# Patient Record
Sex: FEMALE | Race: WHITE | NOT HISPANIC OR LATINO | URBAN - METROPOLITAN AREA
[De-identification: names, ages, dates, MRNs, and addresses within clinical notes are randomized per-mention and may not be internally consistent; named-entity substitution may affect disease eponyms.]

---

## 2019-09-20 ENCOUNTER — INPATIENT (INPATIENT)
Facility: HOSPITAL | Age: 74
LOS: 3 days | Discharge: HOME | DRG: 482 | End: 2019-09-24
Attending: SPECIALIST | Admitting: SPECIALIST
Payer: MEDICARE

## 2019-09-20 VITALS
OXYGEN SATURATION: 100 % | WEIGHT: 160.06 LBS | RESPIRATION RATE: 14 BRPM | DIASTOLIC BLOOD PRESSURE: 86 MMHG | SYSTOLIC BLOOD PRESSURE: 140 MMHG | HEART RATE: 82 BPM | HEIGHT: 67 IN | TEMPERATURE: 98 F

## 2019-09-20 DIAGNOSIS — Z98.890 OTHER SPECIFIED POSTPROCEDURAL STATES: Chronic | ICD-10-CM

## 2019-09-20 DIAGNOSIS — Z90.49 ACQUIRED ABSENCE OF OTHER SPECIFIED PARTS OF DIGESTIVE TRACT: Chronic | ICD-10-CM

## 2019-09-20 DIAGNOSIS — Z90.5 ACQUIRED ABSENCE OF KIDNEY: Chronic | ICD-10-CM

## 2019-09-20 DIAGNOSIS — S42.001A FRACTURE OF UNSPECIFIED PART OF RIGHT CLAVICLE, INITIAL ENCOUNTER FOR CLOSED FRACTURE: ICD-10-CM

## 2019-09-20 DIAGNOSIS — S72.011A UNSPECIFIED INTRACAPSULAR FRACTURE OF RIGHT FEMUR, INITIAL ENCOUNTER FOR CLOSED FRACTURE: ICD-10-CM

## 2019-09-20 LAB
ALBUMIN SERPL ELPH-MCNC: 3.7 G/DL — SIGNIFICANT CHANGE UP (ref 3.3–5)
ALP SERPL-CCNC: 98 U/L — SIGNIFICANT CHANGE UP (ref 40–120)
ALT FLD-CCNC: 21 U/L — SIGNIFICANT CHANGE UP (ref 10–45)
ANION GAP SERPL CALC-SCNC: 12 MMOL/L — SIGNIFICANT CHANGE UP (ref 5–17)
APTT BLD: 27 SEC — LOW (ref 27.5–36.3)
AST SERPL-CCNC: 20 U/L — SIGNIFICANT CHANGE UP (ref 10–40)
BASOPHILS # BLD AUTO: 0 K/UL — SIGNIFICANT CHANGE UP (ref 0–0.2)
BASOPHILS NFR BLD AUTO: 0.2 % — SIGNIFICANT CHANGE UP (ref 0–2)
BILIRUB SERPL-MCNC: 0.6 MG/DL — SIGNIFICANT CHANGE UP (ref 0.2–1.2)
BUN SERPL-MCNC: 19 MG/DL — SIGNIFICANT CHANGE UP (ref 7–23)
CALCIUM SERPL-MCNC: 9 MG/DL — SIGNIFICANT CHANGE UP (ref 8.4–10.5)
CHLORIDE SERPL-SCNC: 103 MMOL/L — SIGNIFICANT CHANGE UP (ref 96–108)
CO2 SERPL-SCNC: 23 MMOL/L — SIGNIFICANT CHANGE UP (ref 22–31)
CREAT SERPL-MCNC: 0.92 MG/DL — SIGNIFICANT CHANGE UP (ref 0.5–1.3)
EOSINOPHIL # BLD AUTO: 0.1 K/UL — SIGNIFICANT CHANGE UP (ref 0–0.5)
EOSINOPHIL NFR BLD AUTO: 0.8 % — SIGNIFICANT CHANGE UP (ref 0–6)
GLUCOSE SERPL-MCNC: 109 MG/DL — HIGH (ref 70–99)
HCT VFR BLD CALC: 36.3 % — SIGNIFICANT CHANGE UP (ref 34.5–45)
HGB BLD-MCNC: 12.1 G/DL — SIGNIFICANT CHANGE UP (ref 11.5–15.5)
INR BLD: 1.01 RATIO — SIGNIFICANT CHANGE UP (ref 0.88–1.16)
LYMPHOCYTES # BLD AUTO: 1 K/UL — SIGNIFICANT CHANGE UP (ref 1–3.3)
LYMPHOCYTES # BLD AUTO: 9.9 % — LOW (ref 13–44)
MCHC RBC-ENTMCNC: 30.7 PG — SIGNIFICANT CHANGE UP (ref 27–34)
MCHC RBC-ENTMCNC: 33.2 GM/DL — SIGNIFICANT CHANGE UP (ref 32–36)
MCV RBC AUTO: 92.5 FL — SIGNIFICANT CHANGE UP (ref 80–100)
MONOCYTES # BLD AUTO: 0.6 K/UL — SIGNIFICANT CHANGE UP (ref 0–0.9)
MONOCYTES NFR BLD AUTO: 5.8 % — SIGNIFICANT CHANGE UP (ref 2–14)
NEUTROPHILS # BLD AUTO: 8.2 K/UL — HIGH (ref 1.8–7.4)
NEUTROPHILS NFR BLD AUTO: 83.2 % — HIGH (ref 43–77)
PLATELET # BLD AUTO: 216 K/UL — SIGNIFICANT CHANGE UP (ref 150–400)
POTASSIUM SERPL-MCNC: 4.1 MMOL/L — SIGNIFICANT CHANGE UP (ref 3.5–5.3)
POTASSIUM SERPL-SCNC: 4.1 MMOL/L — SIGNIFICANT CHANGE UP (ref 3.5–5.3)
PROT SERPL-MCNC: 6.1 G/DL — SIGNIFICANT CHANGE UP (ref 6–8.3)
PROTHROM AB SERPL-ACNC: 11.6 SEC — SIGNIFICANT CHANGE UP (ref 10–12.9)
RBC # BLD: 3.92 M/UL — SIGNIFICANT CHANGE UP (ref 3.8–5.2)
RBC # FLD: 11.3 % — SIGNIFICANT CHANGE UP (ref 10.3–14.5)
SODIUM SERPL-SCNC: 138 MMOL/L — SIGNIFICANT CHANGE UP (ref 135–145)
WBC # BLD: 9.9 K/UL — SIGNIFICANT CHANGE UP (ref 3.8–10.5)
WBC # FLD AUTO: 9.9 K/UL — SIGNIFICANT CHANGE UP (ref 3.8–10.5)

## 2019-09-20 PROCEDURE — 99222 1ST HOSP IP/OBS MODERATE 55: CPT | Mod: 57

## 2019-09-20 PROCEDURE — 73000 X-RAY EXAM OF COLLAR BONE: CPT | Mod: 26,RT

## 2019-09-20 PROCEDURE — 73502 X-RAY EXAM HIP UNI 2-3 VIEWS: CPT | Mod: 26,RT

## 2019-09-20 PROCEDURE — 99285 EMERGENCY DEPT VISIT HI MDM: CPT

## 2019-09-20 PROCEDURE — 70450 CT HEAD/BRAIN W/O DYE: CPT | Mod: 26

## 2019-09-20 PROCEDURE — 73030 X-RAY EXAM OF SHOULDER: CPT | Mod: 26,RT

## 2019-09-20 PROCEDURE — 72192 CT PELVIS W/O DYE: CPT | Mod: 26

## 2019-09-20 PROCEDURE — 73060 X-RAY EXAM OF HUMERUS: CPT | Mod: 26,RT

## 2019-09-20 PROCEDURE — 72125 CT NECK SPINE W/O DYE: CPT | Mod: 26

## 2019-09-20 PROCEDURE — 76377 3D RENDER W/INTRP POSTPROCES: CPT | Mod: 26

## 2019-09-20 PROCEDURE — 93010 ELECTROCARDIOGRAM REPORT: CPT

## 2019-09-20 PROCEDURE — 71045 X-RAY EXAM CHEST 1 VIEW: CPT | Mod: 26

## 2019-09-20 PROCEDURE — 73552 X-RAY EXAM OF FEMUR 2/>: CPT | Mod: 26,RT

## 2019-09-20 RX ORDER — DOCUSATE SODIUM 100 MG
100 CAPSULE ORAL THREE TIMES A DAY
Refills: 0 | Status: DISCONTINUED | OUTPATIENT
Start: 2019-09-20 | End: 2019-09-24

## 2019-09-20 RX ORDER — DIPHENHYDRAMINE HCL 50 MG
25 CAPSULE ORAL AT BEDTIME
Refills: 0 | Status: DISCONTINUED | OUTPATIENT
Start: 2019-09-20 | End: 2019-09-24

## 2019-09-20 RX ORDER — INFLUENZA VIRUS VACCINE 15; 15; 15; 15 UG/.5ML; UG/.5ML; UG/.5ML; UG/.5ML
0.5 SUSPENSION INTRAMUSCULAR ONCE
Refills: 0 | Status: COMPLETED | OUTPATIENT
Start: 2019-09-20 | End: 2019-09-24

## 2019-09-20 RX ORDER — OXYCODONE HYDROCHLORIDE 5 MG/1
5 TABLET ORAL EVERY 4 HOURS
Refills: 0 | Status: DISCONTINUED | OUTPATIENT
Start: 2019-09-20 | End: 2019-09-24

## 2019-09-20 RX ORDER — ACETAMINOPHEN 500 MG
975 TABLET ORAL ONCE
Refills: 0 | Status: COMPLETED | OUTPATIENT
Start: 2019-09-20 | End: 2019-09-20

## 2019-09-20 RX ORDER — OXYCODONE HYDROCHLORIDE 5 MG/1
10 TABLET ORAL EVERY 4 HOURS
Refills: 0 | Status: DISCONTINUED | OUTPATIENT
Start: 2019-09-20 | End: 2019-09-24

## 2019-09-20 RX ORDER — FAMOTIDINE 10 MG/ML
20 INJECTION INTRAVENOUS EVERY 12 HOURS
Refills: 0 | Status: DISCONTINUED | OUTPATIENT
Start: 2019-09-20 | End: 2019-09-20

## 2019-09-20 RX ORDER — SODIUM CHLORIDE 9 MG/ML
1000 INJECTION INTRAMUSCULAR; INTRAVENOUS; SUBCUTANEOUS
Refills: 0 | Status: DISCONTINUED | OUTPATIENT
Start: 2019-09-20 | End: 2019-09-24

## 2019-09-20 RX ORDER — OXYCODONE HYDROCHLORIDE 5 MG/1
5 TABLET ORAL ONCE
Refills: 0 | Status: DISCONTINUED | OUTPATIENT
Start: 2019-09-20 | End: 2019-09-20

## 2019-09-20 RX ORDER — HEPARIN SODIUM 5000 [USP'U]/ML
5000 INJECTION INTRAVENOUS; SUBCUTANEOUS EVERY 8 HOURS
Refills: 0 | Status: COMPLETED | OUTPATIENT
Start: 2019-09-20 | End: 2019-09-20

## 2019-09-20 RX ADMIN — OXYCODONE HYDROCHLORIDE 5 MILLIGRAM(S): 5 TABLET ORAL at 18:08

## 2019-09-20 RX ADMIN — Medication 975 MILLIGRAM(S): at 13:20

## 2019-09-20 NOTE — H&P ADULT - NSHPLABSRESULTS_GEN_ALL_CORE
Imaging:  XR demonstrating R femoral neck fracture and R distal clavicle fracture                            12.1   9.9   )-----------( 216      ( 20 Sep 2019 17:55 )             36.3       09-20    138  |  103  |  19  ----------------------------<  109<H>  4.1   |  23  |  0.92    Ca    9.0      20 Sep 2019 17:55    TPro  6.1  /  Alb  3.7  /  TBili  0.6  /  DBili  x   /  AST  20  /  ALT  21  /  AlkPhos  98  09-20                  PT/INR - ( 20 Sep 2019 17:55 )   PT: 11.6 sec;   INR: 1.01 ratio         PTT - ( 20 Sep 2019 17:55 )  PTT:27.0 sec    Lactate Trend            CAPILLARY BLOOD GLUCOSE

## 2019-09-20 NOTE — ED PROVIDER NOTE - OBJECTIVE STATEMENT
74 yo female with unremarkable pmhx presenting with shoulder and right hip pain s/p fall. Patient drove in from [place], walked up stairs and fell upstairs today, fell on her right shoulder and hip, and since then has had constant moderate pain 72 yo female with unremarkable pmhx presenting with shoulder and right hip pain s/p fall. Patient drove in from [place], walked up stairs and fell upstairs today, fell on her right shoulder and hip, and since then has had constant moderate pain since then. Has been unable to ambulate and move arm since then.    Denies LOC CP SOB rash, bleeding, head trauma, denies being on AC  PMHx- negative  PSHx- ccx, kidney donation, SBO, ventral hernia repair

## 2019-09-20 NOTE — ED PROVIDER NOTE - CLINICAL SUMMARY MEDICAL DECISION MAKING FREE TEXT BOX
74 yo female it unremarkable pmhx presenting with right hip/shoulder pain s/p fall. will evaluate for fx with xray hip and shoulder, will give pain control, and will reassess 72 yo female it unremarkable pmhx presenting with right hip/shoulder pain s/p fall. will evaluate for fx with xray hip and shoulder, will give pain control, and will reassess    TJewell Quiroz MD: Pt is a 72 y/o female with unremarkable pmhx, not on medications, presenting with shoulder and right hip pain s/p fall. Patient drove in from [place], walked up stairs and fell upstairs today, fell on her right shoulder and hip, and since then has had constant moderate pain since then. Has been unable to ambulate and move arm since then. Ddx includes, however, is not limited to: R hip fx, pelvic fx, shoulder fx, humeral fx/dislocation, other traumatic injury. Plan: XR R shoulder, R hip, pelvis, pain control, CTH, reassess

## 2019-09-20 NOTE — H&P ADULT - NSHPPHYSICALEXAM_GEN_ALL_CORE
T(C): 36.5 (09-20-19 @ 12:23)  HR: 82 (09-20-19 @ 12:23)  BP: 140/86 (09-20-19 @ 12:23)  RR: 14 (09-20-19 @ 12:23)  SpO2: 100% (09-20-19 @ 12:23)  Wt(kg): --    PE   RLE: Skin intact; Compartments soft. No ecchymosis/soft tissue swelling.  + TTP around R Hip. No TTP to knee/leg/ankle/foot. ROM limited 2/2 pain. Unable to SLR. + Log Roll/Heel Strike. + DP/PT pulses 2+/4. SILT L2-S1. + TA/EHL/FHL/GS.    R UE: skin intact, no skin tenting, +ttp clavicle, no bony ttp elsewhere, +ax/msc/r/u/m/ain/pin function, C5-T1 SILT, radial pulse intact, compartments soft, warm/well perfused    Secondary Survey: No TTP over bony prominences, SILT, palpable pulses, full/painless range of motion, compartments soft

## 2019-09-20 NOTE — ED ADULT NURSE NOTE - NSIMPLEMENTINTERV_GEN_ALL_ED
Implemented All Universal Safety Interventions:  [place] to call system. Call bell, personal items and telephone within reach. Instruct patient to call for assistance. Room bathroom lighting operational. Non-slip footwear when patient is off stretcher. Physically safe environment: no spills, clutter or unnecessary equipment. Stretcher in lowest position, wheels locked, appropriate side rails in place.

## 2019-09-20 NOTE — H&P ADULT - ASSESSMENT
A/P: 73F with R clavicle fracture and R femoral neck fracture  Plan for OR ...  Pain control  NWB R/L UE in sling  Active movement of fingers/wrist/elbow encouraged  Ice  Possible need for surgical intervention discussed with patient   Follow up with  --- as outpatient in 5-7 days, call office for appointment  No acute intervention, ortho stable for DC A/P: 73F with R clavicle fracture and R femoral neck fracture  Plan for OR tomorrow with Dr. Brown for right hip CRPP  NPO after midnight except meds  IVFs while NPO  Hold chem dvt ppx after midnight  SCDs  Medical optimization for OR  Pain control  Bedrest, NWB RLE  NWB RUE In sling  Likely conservative management of right clavicle fracture  Active movement of fingers/wrist/elbow encouraged  Ice  Will discuss with Dr. Brown and advise if plan changes

## 2019-09-20 NOTE — ED PROVIDER NOTE - MUSCULOSKELETAL, MLM
pain with ROM of right shoulder, TTP of anterior right shoulder. slight skin tenting in right clavicle with TTP. TTP of right hip, unable to range due to pain.

## 2019-09-20 NOTE — H&P ADULT - HISTORY OF PRESENT ILLNESS
73y RHD Female presents to ED s/p [facility] fall c/o severe R Hip pain and R clavicle/shoulder pain and inability to ambulate after driving from [place] to help her friend during move.  Patient denies head hit or LOC. Localizes pain to R hip/femur. Patient denies radiation of pain. Patient denies numbness/tingling/burning in the RLE. Patient denies any other injuries. Patient is a community ambulator without assistive devices. Patient has no issues w/ ADLs/IADLs.  Patient is s/p nephrectomy for kidney donation.

## 2019-09-20 NOTE — CONSULT NOTE ADULT - SUBJECTIVE AND OBJECTIVE BOX
Patient is a 73y old  Female who presents with a chief complaint of right femoral neck fracture (20 Sep 2019 21:31)      HPI:  73y RHD Female presents to ED s/p mech fall c/o severe R Hip pain and R clavicle/shoulder pain and inability to ambulate after driving from [place] to help her friend during move.  Patient denies head hit or LOC. Localizes pain to R hip/femur. Patient denies radiation of pain. Patient denies numbness/tingling/burning in the RLE. Patient denies any other injuries. Patient is a community ambulator without assistive devices. Patient has no issues w/ ADLs/IADLs.  Patient is s/p nephrectomy for kidney donation. (20 Sep 2019 21:31)      MEDICATIONS  (STANDING):  docusate sodium 100 milliGRAM(s) Oral three times a day  influenza   Vaccine 0.5 milliLiter(s) IntraMuscular once  multivitamin 1 Tablet(s) Oral daily  sodium chloride 0.9%. 1000 milliLiter(s) (75 mL/Hr) IV Continuous <Continuous>    MEDICATIONS  (PRN):  diphenhydrAMINE 25 milliGRAM(s) Oral at bedtime PRN Insomnia  oxyCODONE    IR 10 milliGRAM(s) Oral every 4 hours PRN Severe Pain (7 - 10)  oxyCODONE    IR 5 milliGRAM(s) Oral every 4 hours PRN Moderate Pain (4 - 6)      Allergies    Cipro (Hives)  proton pump inhibitors (Other (Moderate))    Intolerances      PAST MEDICAL HISTORY:  No pertinent past medical history    PAST SURGICAL HISTORY:  S/p nephrectomy  H/O ventral hernia repair  S/P cholecystectomy      Diet:  ( x  ) Regular   (   ) Low Sodium   (   ) Low Cholesterol   (   ) Diabetic   (   ) Other    FAMILY HISTORY:      SOCIAL HISTORY:  Marital Status:  (   )    (   ) Single    (   )    (   )   Occupation:   Lives with: (   ) alone  (   ) children   (   ) spouse   (   ) parents  (   ) other  Substance Use: (  x) never used  (  ) other:  Tobacco Usage:  ( x  ) never smoked   (   ) former smoker   (   ) current smoker  (   ) pack years  (   ) last cigarette date  Alcohol Usage:  rarely  Advanced Directives: (   ) None    (   ) DNR    (   ) DNI    (   ) Health Care Proxy:     REVIEW OF SYSTEM:  CONSTITUTIONAL: No fever, No change in weight, No fatigue  HEAD: No headache, No dizziness, No recent trauma  EYES: No eye pain, No visual disturbances, No discharge  ENT:  No difficulty hearing, No tinnitus, No vertigo, No sinus pain, No throat pain  NECK: No pain, No stiffness  BREASTS: No pain, No masses, No nipple discharge  RESPIRATORY: No cough, No wheezing, No chills, No hemoptysis, No shortness of breath at rest or exertional shortness of breath  CARDIOVASCULAR: No chest pain, No palpitations, No dizziness, No CHF, No arrhythmia, No cardiomegaly, No leg swelling  GASTROINTESTINAL: No abdominal, No epigastric pain. No nausea, No vomiting, No hematemesis, No diarrhea, No constipation. No melena, No hematochezia. No GERD  GENITOURINARY: No dysuria, No frequency, No hematuria, No incontinence, No nocturia, No hesitancy,  SKIN: No itching, No burning, No rashes, No lesions   LYMPH NODES: No history of enlarged glands  ENDOCRINE: No heat or cold intolerance, No hair loss. No osteoporosis, No thyroid disease  MUSCULOSKELETAL: No joint pain or swelling,   (+) right hip pain and right clavicle pain   PSYCHIATRIC: No depression, No anxiety, No mood swings, No difficulty sleeping  HEME/LYMPH: No easy bruising, No anticoagulants, No bleeding disorder, No bleeding gums  ALLERGY AND IMMUNOLOGIC: No hives, No eczema  NEUROLOGICAL: No memory loss, No loss of strength, No numbness, No tremors    VITALS:  Vital Signs Last 24 Hrs  T(C): 36.7 (20 Sep 2019 23:17), Max: 36.8 (20 Sep 2019 21:40)  T(F): 98.1 (20 Sep 2019 23:17), Max: 98.3 (20 Sep 2019 21:40)  HR: 70 (20 Sep 2019 23:17) (68 - 82)  BP: 125/58 (20 Sep 2019 23:17) (125/58 - 141/85)  BP(mean): --  RR: 18 (20 Sep 2019 23:17) (14 - 18)  SpO2: 98% (20 Sep 2019 23:17) (97% - 100%)  I&O's Summary      PHYSICAL EXAM:  GENERAL: NAD, well nourished and conversant  HEAD:  Atraumatic  EYES: EOM, PERRLA, conjunctiva pink and sclera white  ENT: No tonsillar erythema, exudates, or enlargement, moist mucous membranes, good dentition, no lesions  NECK: Supple, No JVD, normal thyroid, carotids with normal upstrokes and no bruits  CHEST/LUNG: Clear to auscultation bilaterally, No rales, rhonchi, wheezing, or rubs  HEART: Regular rate and rhythm, No murmurs, rubs, or gallops  ABDOMEN: Soft, nondistended, no masses, guarding, tenderness or rebound, bowel sounds present  EXTREMITIES:  2+ Peripheral Pulses, No clubbing, cyanosis, or edema. (+) Fracture right clavicle  (+) right femoral neck fracture  SKIN: No rashes or lesions  NERVOUS SYSTEM:  Alert & Oriented X3, normal cognitive function. Motor Strength 5/5 right upper and right lower.  5/5 left upper and left lower extremities, DTRs 2+ intact and symmetric    LABS:    EKG NSR no acute changes      CBC Full  -  ( 20 Sep 2019 17:55 )  WBC Count : 9.9 K/uL  RBC Count : 3.92 M/uL  Hemoglobin : 12.1 g/dL  Hematocrit : 36.3 %  Platelet Count - Automated : 216 K/uL  Mean Cell Volume : 92.5 fl  Mean Cell Hemoglobin : 30.7 pg  Mean Cell Hemoglobin Concentration : 33.2 gm/dL  Auto Neutrophil # : 8.2 K/uL  Auto Lymphocyte # : 1.0 K/uL  Auto Monocyte # : 0.6 K/uL  Auto Eosinophil # : 0.1 K/uL  Auto Basophil # : 0.0 K/uL  Auto Neutrophil % : 83.2 %  Auto Lymphocyte % : 9.9 %  Auto Monocyte % : 5.8 %  Auto Eosinophil % : 0.8 %  Auto Basophil % : 0.2 %    09-20    138  |  103  |  19  ----------------------------<  109<H>  4.1   |  23  |  0.92    Ca    9.0      20 Sep 2019 17:55    TPro  6.1  /  Alb  3.7  /  TBili  0.6  /  DBili  x   /  AST  20  /  ALT  21  /  AlkPhos  98  09-20    LIVER FUNCTIONS - ( 20 Sep 2019 17:55 )  Alb: 3.7 g/dL / Pro: 6.1 g/dL / ALK PHOS: 98 U/L / ALT: 21 U/L / AST: 20 U/L / GGT: x           PT/INR - ( 20 Sep 2019 17:55 )   PT: 11.6 sec;   INR: 1.01 ratio         PTT - ( 20 Sep 2019 17:55 )  PTT:27.0 sec    CAPILLARY BLOOD GLUCOSE          RADIOLOGY & ADDITIONAL TESTS:    Consultant(s):    Care Discussed with Consultants/Other Providers [ ] YES  [ ] NO

## 2019-09-20 NOTE — ED ADULT NURSE NOTE - OBJECTIVE STATEMENT
4 yo female A&OX3 presents to the ED s/p trip and fall. Pt states that she tripped over a uneven surface, pt c/o r shoulder and hip pain unable to ambulate due to worsening pain. Pt r shoulder appears deformed, + pulses in the r arm, + sensation to touch, no numbness or tingling. Pt able to  r fingers and toes. Lungs clear, equal b/l no sob or cough. Pt denies any AC use. MAEX4

## 2019-09-21 LAB
ANION GAP SERPL CALC-SCNC: 10 MMOL/L — SIGNIFICANT CHANGE UP (ref 5–17)
APPEARANCE UR: CLEAR — SIGNIFICANT CHANGE UP
APTT BLD: 27.8 SEC — SIGNIFICANT CHANGE UP (ref 27.5–36.3)
BACTERIA # UR AUTO: NEGATIVE — SIGNIFICANT CHANGE UP
BILIRUB UR-MCNC: NEGATIVE — SIGNIFICANT CHANGE UP
BLD GP AB SCN SERPL QL: NEGATIVE — SIGNIFICANT CHANGE UP
BLD GP AB SCN SERPL QL: NEGATIVE — SIGNIFICANT CHANGE UP
BUN SERPL-MCNC: 16 MG/DL — SIGNIFICANT CHANGE UP (ref 7–23)
CALCIUM SERPL-MCNC: 8.3 MG/DL — LOW (ref 8.4–10.5)
CHLORIDE SERPL-SCNC: 103 MMOL/L — SIGNIFICANT CHANGE UP (ref 96–108)
CO2 SERPL-SCNC: 23 MMOL/L — SIGNIFICANT CHANGE UP (ref 22–31)
COLOR SPEC: YELLOW — SIGNIFICANT CHANGE UP
CREAT SERPL-MCNC: 0.9 MG/DL — SIGNIFICANT CHANGE UP (ref 0.5–1.3)
DIFF PNL FLD: NEGATIVE — SIGNIFICANT CHANGE UP
EPI CELLS # UR: 4 /HPF — SIGNIFICANT CHANGE UP
GLUCOSE SERPL-MCNC: 121 MG/DL — HIGH (ref 70–99)
GLUCOSE UR QL: NEGATIVE — SIGNIFICANT CHANGE UP
HCT VFR BLD CALC: 32.7 % — LOW (ref 34.5–45)
HGB BLD-MCNC: 11.2 G/DL — LOW (ref 11.5–15.5)
HYALINE CASTS # UR AUTO: 0 /LPF — SIGNIFICANT CHANGE UP (ref 0–2)
INR BLD: 1.08 RATIO — SIGNIFICANT CHANGE UP (ref 0.88–1.16)
KETONES UR-MCNC: NEGATIVE — SIGNIFICANT CHANGE UP
LEUKOCYTE ESTERASE UR-ACNC: ABNORMAL
MCHC RBC-ENTMCNC: 31.5 PG — SIGNIFICANT CHANGE UP (ref 27–34)
MCHC RBC-ENTMCNC: 34.2 GM/DL — SIGNIFICANT CHANGE UP (ref 32–36)
MCV RBC AUTO: 92.2 FL — SIGNIFICANT CHANGE UP (ref 80–100)
NITRITE UR-MCNC: NEGATIVE — SIGNIFICANT CHANGE UP
PH UR: 6.5 — SIGNIFICANT CHANGE UP (ref 5–8)
PLATELET # BLD AUTO: 190 K/UL — SIGNIFICANT CHANGE UP (ref 150–400)
POTASSIUM SERPL-MCNC: 3.9 MMOL/L — SIGNIFICANT CHANGE UP (ref 3.5–5.3)
POTASSIUM SERPL-SCNC: 3.9 MMOL/L — SIGNIFICANT CHANGE UP (ref 3.5–5.3)
PROT UR-MCNC: SIGNIFICANT CHANGE UP
PROTHROM AB SERPL-ACNC: 12.4 SEC — SIGNIFICANT CHANGE UP (ref 10–12.9)
RBC # BLD: 3.54 M/UL — LOW (ref 3.8–5.2)
RBC # FLD: 11.2 % — SIGNIFICANT CHANGE UP (ref 10.3–14.5)
RBC CASTS # UR COMP ASSIST: 9 /HPF — HIGH (ref 0–4)
RH IG SCN BLD-IMP: POSITIVE — SIGNIFICANT CHANGE UP
RH IG SCN BLD-IMP: POSITIVE — SIGNIFICANT CHANGE UP
SODIUM SERPL-SCNC: 136 MMOL/L — SIGNIFICANT CHANGE UP (ref 135–145)
SP GR SPEC: 1.02 — SIGNIFICANT CHANGE UP (ref 1.01–1.02)
UROBILINOGEN FLD QL: ABNORMAL
WBC # BLD: 5.3 K/UL — SIGNIFICANT CHANGE UP (ref 3.8–10.5)
WBC # FLD AUTO: 5.3 K/UL — SIGNIFICANT CHANGE UP (ref 3.8–10.5)
WBC UR QL: 9 /HPF — HIGH (ref 0–5)

## 2019-09-21 PROCEDURE — 23500 CLTX CLAVICULAR FX W/O MNPJ: CPT | Mod: 59,RT

## 2019-09-21 PROCEDURE — 27236 TREAT THIGH FRACTURE: CPT | Mod: RT

## 2019-09-21 RX ORDER — ENOXAPARIN SODIUM 100 MG/ML
40 INJECTION SUBCUTANEOUS DAILY
Refills: 0 | Status: DISCONTINUED | OUTPATIENT
Start: 2019-09-21 | End: 2019-09-24

## 2019-09-21 RX ORDER — HYDROMORPHONE HYDROCHLORIDE 2 MG/ML
0.5 INJECTION INTRAMUSCULAR; INTRAVENOUS; SUBCUTANEOUS
Refills: 0 | Status: DISCONTINUED | OUTPATIENT
Start: 2019-09-21 | End: 2019-09-21

## 2019-09-21 RX ORDER — ONDANSETRON 8 MG/1
4 TABLET, FILM COATED ORAL ONCE
Refills: 0 | Status: DISCONTINUED | OUTPATIENT
Start: 2019-09-21 | End: 2019-09-21

## 2019-09-21 RX ADMIN — OXYCODONE HYDROCHLORIDE 5 MILLIGRAM(S): 5 TABLET ORAL at 01:00

## 2019-09-21 RX ADMIN — OXYCODONE HYDROCHLORIDE 5 MILLIGRAM(S): 5 TABLET ORAL at 00:33

## 2019-09-21 RX ADMIN — SODIUM CHLORIDE 75 MILLILITER(S): 9 INJECTION INTRAMUSCULAR; INTRAVENOUS; SUBCUTANEOUS at 00:34

## 2019-09-21 RX ADMIN — Medication 100 MILLIGRAM(S): at 05:41

## 2019-09-21 NOTE — PROGRESS NOTE ADULT - ASSESSMENT
73y RHD Female presents to ED s/p [facility] fall c/o severe R Hip pain and R clavicle/shoulder pain and inability to ambulate after driving from [place] to help her friend during move.  Patient denies head hit or LOC. Localizes pain to R hip/femur. Patient denies radiation of pain. Patient denies numbness/tingling/burning in the RLE. Patient denies any other injuries. Patient is a community ambulator without assistive devices. Patient has no issues w/ ADLs/IADLs.  Patient is s/p nephrectomy for kidney donation. (20 Sep 2019 21:31)  Patient seen postop  tolerated surgery well.  ·  POST-OP DIAGNOSIS:  Fracture of femoral neck, right 21-Sep-2019 13:49:34   ·  PROCEDURES:  Closed reduction and percutaneous pinning of right hip 21-Sep-2019 13:49:14    Tolerated surgery well

## 2019-09-21 NOTE — BRIEF OPERATIVE NOTE - NSICDXBRIEFPROCEDURE_GEN_ALL_CORE_FT
PROCEDURES:  Closed reduction and percutaneous pinning of right hip 21-Sep-2019 13:49:14  Gama Edmondson

## 2019-09-21 NOTE — PROGRESS NOTE ADULT - SUBJECTIVE AND OBJECTIVE BOX
Patient is a 73y old  Female who presents with a chief complaint of right femoral neck fracture (20 Sep 2019 21:31)      HPI:  73y RHD Female presents to ED s/p mech fall c/o severe R Hip pain and R clavicle/shoulder pain and inability to ambulate after driving from [place] to help her friend during move.  Patient denies head hit or LOC. Localizes pain to R hip/femur. Patient denies radiation of pain. Patient denies numbness/tingling/burning in the RLE. Patient denies any other injuries. Patient is a community ambulator without assistive devices. Patient has no issues w/ ADLs/IADLs.  Patient is s/p nephrectomy for kidney donation. (20 Sep 2019 21:31)  Patient seen postop  tolerated surgery well.  ·  POST-OP DIAGNOSIS:  Fracture of femoral neck, right 21-Sep-2019 13:49:34   ·  PROCEDURES:  Closed reduction and percutaneous pinning of right hip 21-Sep-2019 13:49:14    MEDICATIONS  (STANDING):  docusate sodium 100 milliGRAM(s) Oral three times a day  influenza   Vaccine 0.5 milliLiter(s) IntraMuscular once  multivitamin 1 Tablet(s) Oral daily  sodium chloride 0.9%. 1000 milliLiter(s) (75 mL/Hr) IV Continuous <Continuous>    MEDICATIONS  (PRN):  diphenhydrAMINE 25 milliGRAM(s) Oral at bedtime PRN Insomnia  oxyCODONE    IR 10 milliGRAM(s) Oral every 4 hours PRN Severe Pain (7 - 10)  oxyCODONE    IR 5 milliGRAM(s) Oral every 4 hours PRN Moderate Pain (4 - 6)      Allergies    Cipro (Hives)  proton pump inhibitors (Other (Moderate))    Intolerances          Diet:  ( x  ) Regular   (   ) Low Sodium   (   ) Low Cholesterol   (   ) Diabetic   (   ) Other    FAMILY HISTORY:  T(C): 36.9 (09-21-19 @ 05:33), Max: 36.9 (09-21-19 @ 05:33)  HR: 75 (09-21-19 @ 05:33) (68 - 82)  BP: 111/68 (09-21-19 @ 05:33) (111/68 - 141/85)  RR: 18 (09-21-19 @ 05:33) (14 - 18)  SpO2: 94% (09-21-19 @ 05:33) (94% - 100%)  Wt(kg): --            PHYSICAL EXAM:  GENERAL: NAD, well nourished and conversant  HEAD:  Atraumatic  EYES: EOM, PERRLA, conjunctiva pink and sclera white  ENT: No tonsillar erythema, exudates, or enlargement, moist mucous membranes, good dentition, no lesions  NECK: Supple, No JVD, normal thyroid, carotids with normal upstrokes and no bruits  CHEST/LUNG: Clear to auscultation bilaterally, No rales, rhonchi, wheezing, or rubs  HEART: Regular rate and rhythm, No murmurs, rubs, or gallops  ABDOMEN: Soft, nondistended, no masses, guarding, tenderness or rebound, bowel sounds present  EXTREMITIES:  2+ Peripheral Pulses, No clubbing, cyanosis, or edema. (+) Fracture right clavicle  (+) right femoral neck fracture Percutaneous pinning of right hip  SKIN: No rashes or lesions  NERVOUS SYSTEM:  Alert & Oriented X3, normal cognitive function. Motor Strength 5/5 right upper and right lower.  5/5 left upper and left lower extremities, DTRs 2+ intact and symmetric    LABS:                   11.2   5.3   )-----------( 190      ( 21 Sep 2019 06:39 )             32.7    09-21    136  |  103  |  16  ----------------------------<  121<H>  3.9   |  23  |  0.90    Ca    8.3<L>      21 Sep 2019 06:39    TPro  6.1  /  Alb  3.7  /  TBili  0.6  /  DBili  x   /  AST  20  /  ALT  21  /  AlkPhos  98  09-20    PT/INR - ( 21 Sep 2019 06:39 )   PT: 12.4 sec;   INR: 1.08 ratio         PTT - ( 21 Sep 2019 06:39 )  PTT:27.8 sec

## 2019-09-21 NOTE — CHART NOTE - NSCHARTNOTEFT_GEN_A_CORE
Resting without complaints.  No Chest Pain, SOB, N/V.    T(C): 36.5 (09-21-19 @ 14:45), Max: 37 (09-21-19 @ 08:50)  HR: 73 (09-21-19 @ 14:45) (68 - 81)  BP: 124/60 (09-21-19 @ 14:45) (103/65 - 141/85)  RR: 13 (09-21-19 @ 14:45) (13 - 18)  SpO2: 97% (09-21-19 @ 14:45) (94% - 100%)      Exam:  Alert and [place], No Acute Distress  Card: +S1/S2, RRR  Pulm: CTAB    Laterality: R Clavicle (Non-Op)  Dressing: In Sling  SILT  Radial Pulse appreciate 2+  Normal Capillary Refill Digits 1-5 < 2 seconds    Laterality: R Hip  Dressing: Aquacel C/D/I  Calves soft, non-tender bilaterally  +PF/DF/EHL/FHL  SILT  + DP pulse    Xray: Intraop Fluroscopy: S/P R Hip CRPP ORIF, Hardware intact and in place with no signs of sara hardware Fx.                          11.2   5.3   )-----------( 190      ( 21 Sep 2019 06:39 )             32.7    09-21    136  |  103  |  16  ----------------------------<  121<H>  3.9   |  23  |  0.90    Ca    8.3<L>      21 Sep 2019 06:39    TPro  6.1  /  Alb  3.7  /  TBili  0.6  /  DBili  x   /  AST  20  /  ALT  21  /  AlkPhos  98  09-20      A/P: 73y Female S/p R Hip CRPP ORIF. R Clavicular Fx Non-Op, VSS. NAD  -PT/OT-WBAT RLE, NWB RUE in Sling.  -IS  -DVT PPx: Lovenox and SCDs  -Pain Control  -Continue Current Tx  -Dispo planning PACU to Floor    Mario Augustin PA-C  Orthopedic Surgery Team  Team Pager #[phone]

## 2019-09-21 NOTE — PROGRESS NOTE ADULT - SUBJECTIVE AND OBJECTIVE BOX
Patient resting without complaints.  No chest pain, SOB, N/V.    T(C): 36.9 (09-21-19 @ 05:33), Max: 36.9 (09-21-19 @ 05:33)  HR: 75 (09-21-19 @ 05:33) (68 - 82)  BP: 111/68 (09-21-19 @ 05:33) (111/68 - 141/85)  RR: 18 (09-21-19 @ 05:33) (14 - 18)  SpO2: 94% (09-21-19 @ 05:33) (94% - 100%)  Wt(kg): --    Exam:  Alert and Oriented, No Acute Distress  RUE in sling, +Radial pulse, SILT, wrist/digits mobile  R hip compartments soft/compressible  Calves Soft, Non-tender bilaterally  +PF/DF/EHL/FHL  SILT  +DP Pulse                       11.2   5.3   )-----------( 190      ( 21 Sep 2019 06:39 )             32.7    09-21    136  |  103  |  16  ----------------------------<  121<H>  3.9   |  23  |  0.90    Ca    8.3<L>      21 Sep 2019 06:39    TPro  6.1  /  Alb  3.7  /  TBili  0.6  /  DBili  x   /  AST  20  /  ALT  21  /  AlkPhos  98  09-20    PT/INR - ( 21 Sep 2019 06:39 )   PT: 12.4 sec;   INR: 1.08 ratio         PTT - ( 21 Sep 2019 06:39 )  PTT:27.8 sec    UA pending    < from: Xray Chest 1 View AP/PA (09.20.19 @ 14:45) >  IMPRESSION:  The cardiomediastinal silhouette is unremarkable.  The lungs are clear.. No evidence of pleural effusion or pneumothorax.  Bilateral breast implants. The visualized osseous and soft tissue   structures demonstrate no acute pathology.    < end of copied text >    EKG charted

## 2019-09-21 NOTE — PROGRESS NOTE ADULT - PROBLEM SELECTOR PLAN 1
Bedrest, NWB RUE, sling  OR today for R hip CRPP  IS  DVT PPx held  NPO/IVF  Pain Control  Continue Current Tx.  FU pending UA/type and screen    Wilfrido Magaña PA-C  Team Pager: #[phone]

## 2019-09-22 LAB
ANION GAP SERPL CALC-SCNC: 12 MMOL/L — SIGNIFICANT CHANGE UP (ref 5–17)
BUN SERPL-MCNC: 17 MG/DL — SIGNIFICANT CHANGE UP (ref 7–23)
CALCIUM SERPL-MCNC: 8.3 MG/DL — LOW (ref 8.4–10.5)
CHLORIDE SERPL-SCNC: 103 MMOL/L — SIGNIFICANT CHANGE UP (ref 96–108)
CO2 SERPL-SCNC: 23 MMOL/L — SIGNIFICANT CHANGE UP (ref 22–31)
CREAT SERPL-MCNC: 0.95 MG/DL — SIGNIFICANT CHANGE UP (ref 0.5–1.3)
GLUCOSE SERPL-MCNC: 127 MG/DL — HIGH (ref 70–99)
HCT VFR BLD CALC: 33.4 % — LOW (ref 34.5–45)
HGB BLD-MCNC: 10.5 G/DL — LOW (ref 11.5–15.5)
MCHC RBC-ENTMCNC: 29.4 PG — SIGNIFICANT CHANGE UP (ref 27–34)
MCHC RBC-ENTMCNC: 31.4 GM/DL — LOW (ref 32–36)
MCV RBC AUTO: 93.6 FL — SIGNIFICANT CHANGE UP (ref 80–100)
PLATELET # BLD AUTO: 195 K/UL — SIGNIFICANT CHANGE UP (ref 150–400)
POTASSIUM SERPL-MCNC: 4.2 MMOL/L — SIGNIFICANT CHANGE UP (ref 3.5–5.3)
POTASSIUM SERPL-SCNC: 4.2 MMOL/L — SIGNIFICANT CHANGE UP (ref 3.5–5.3)
RBC # BLD: 3.57 M/UL — LOW (ref 3.8–5.2)
RBC # FLD: 12.6 % — SIGNIFICANT CHANGE UP (ref 10.3–14.5)
SODIUM SERPL-SCNC: 138 MMOL/L — SIGNIFICANT CHANGE UP (ref 135–145)
WBC # BLD: 6.81 K/UL — SIGNIFICANT CHANGE UP (ref 3.8–10.5)
WBC # FLD AUTO: 6.81 K/UL — SIGNIFICANT CHANGE UP (ref 3.8–10.5)

## 2019-09-22 RX ADMIN — Medication 100 MILLIGRAM(S): at 21:09

## 2019-09-22 RX ADMIN — ENOXAPARIN SODIUM 40 MILLIGRAM(S): 100 INJECTION SUBCUTANEOUS at 12:37

## 2019-09-22 RX ADMIN — Medication 1 TABLET(S): at 12:37

## 2019-09-22 RX ADMIN — Medication 100 MILLIGRAM(S): at 05:29

## 2019-09-22 NOTE — PHYSICAL THERAPY INITIAL EVALUATION ADULT - PERTINENT HX OF CURRENT PROBLEM, REHAB EVAL
Mr. Rapp is a 75 y/o male nursing home resident with PMH of a CVA (March 2019), has residual right sided hemiplegia / hemiparesis, HTN, dyslipidemia, chronic respiratory failure s/p CVA is s/p trach / peg, +NIDDM as per the OSH, hemorrhagic gastric ulcers, MRSA in the sputum, C'diff,  and a questionable PE as per the family. The pt had been admitted to an OSH ([facility]) from [facility] ([phone]) for a 1 day hx of vomiting coffee ground emesis. 73y RHD Female presents to ED s/p [facility] fall c/o severe R Hip pain and R clavicle/shoulder pain and inability to ambulate after driving from [place] to help her friend during move. CT pelvic 9/20, impact R subcapital femoral fx, CT head/neck (-), ECG (-), XR R hip 9/20, R femoral neck fx, XR R shoulder 9/20, acute displaced comminuted fx of the distal R clavicle, CXR (-)

## 2019-09-22 NOTE — PROGRESS NOTE ADULT - ASSESSMENT
73y RHD Female presents to ED s/p [facility] fall c/o severe R Hip pain and R clavicle/shoulder pain and inability to ambulate after driving from [place] to help her friend during move.  Patient denies head hit or LOC. Localizes pain to R hip/femur. Patient denies radiation of pain. Patient denies numbness/tingling/burning in the RLE. Patient denies any other injuries. Patient is a community ambulator without assistive devices. Patient has no issues w/ ADLs/IADLs.  Patient is s/p nephrectomy for kidney donation. (20 Sep 2019 21:31)  Patient seen postop  tolerated surgery well.  ·  POST-OP DIAGNOSIS:  Fracture of femoral neck, right 21-Sep-2019 13:49:34   ·  PROCEDURES:  Closed reduction and percutaneous pinning of right hip 21-Sep-2019 13:49:14    Tolerated surgery well

## 2019-09-22 NOTE — PROGRESS NOTE ADULT - PROBLEM SELECTOR PLAN 1
Stable post op incentive spirometry  Follow cbc and sma7  Doing well post op  Incentive spirometry  DVT and GI prophylaxis

## 2019-09-22 NOTE — PROGRESS NOTE ADULT - SUBJECTIVE AND OBJECTIVE BOX
Post op Day [1]    Patient resting without complaints.  No chest pain, SOB, N/V.    T(C): 36.9 (09-22-19 @ 05:20), Max: 37 (09-21-19 @ 08:50)  HR: 73 (09-22-19 @ 05:20) (68 - 88)  BP: 112/67 (09-22-19 @ 05:20) (103/65 - 134/78)  RR: 18 (09-22-19 @ 05:20) (13 - 18)  SpO2: 98% (09-22-19 @ 05:20) (94% - 100%)    Exam:  Alert and oriented, no acute distress  Lower Ext: RLE hip aquacel in place, incision site C/D/I  Upper Ext: in sling  Calves Soft, non-tender bilaterally  +PF/DF/EHL/FHL  +DP Pulse  SILT                            11.2   5.3   )-----------( 190      ( 21 Sep 2019 06:39 )             32.7    09-21    136  |  103  |  16  ----------------------------<  121<H>  3.9   |  23  |  0.90    Ca    8.3<L>      21 Sep 2019 06:39    TPro  6.1  /  Alb  3.7  /  TBili  0.6  /  DBili  x   /  AST  20  /  ALT  21  /  AlkPhos  98  09-20

## 2019-09-22 NOTE — PROGRESS NOTE ADULT - SUBJECTIVE AND OBJECTIVE BOX
Patient is a 73y old  Female who presents with a chief complaint of right femoral neck fracture (20 Sep 2019 21:31)      HPI:  73y RHD Female presents to ED s/p mech fall c/o severe R Hip pain and R clavicle/shoulder pain and inability to ambulate after driving from [place] to help her friend during move.  Patient denies head hit or LOC. Localizes pain to R hip/femur. Patient denies radiation of pain. Patient denies numbness/tingling/burning in the RLE. Patient denies any other injuries. Patient is a community ambulator without assistive devices. Patient has no issues w/ ADLs/IADLs.  Patient is s/p nephrectomy for kidney donation. (20 Sep 2019 21:31)  Patient seen postop  tolerated surgery well.  ·  POST-OP DIAGNOSIS:  Fracture of femoral neck, right 21-Sep-2019 13:49:34   ·  PROCEDURES:  Closed reduction and percutaneous pinning of right hip 21-Sep-2019 13:49:14    MEDICATIONS  (STANDING):  docusate sodium 100 milliGRAM(s) Oral three times a day  influenza   Vaccine 0.5 milliLiter(s) IntraMuscular once  multivitamin 1 Tablet(s) Oral daily  sodium chloride 0.9%. 1000 milliLiter(s) (75 mL/Hr) IV Continuous <Continuous>    MEDICATIONS  (PRN):  diphenhydrAMINE 25 milliGRAM(s) Oral at bedtime PRN Insomnia  oxyCODONE    IR 10 milliGRAM(s) Oral every 4 hours PRN Severe Pain (7 - 10)  oxyCODONE    IR 5 milliGRAM(s) Oral every 4 hours PRN Moderate Pain (4 - 6)      Allergies    Cipro (Hives)  proton pump inhibitors (Other (Moderate))    Intolerances          T(C): 36.9 (09-22-19 @ 05:20), Max: 37 (09-21-19 @ 08:50)  HR: 73 (09-22-19 @ 05:20) (68 - 88)  BP: 112/67 (09-22-19 @ 05:20) (103/65 - 134/78)  RR: 18 (09-22-19 @ 05:20) (13 - 18)  SpO2: 98% (09-22-19 @ 05:20) (94% - 100%)                PHYSICAL EXAM:  GENERAL: NAD, well nourished and conversant  HEAD:  Atraumatic  EYES: EOM, PERRLA, conjunctiva pink and sclera white  ENT: No tonsillar erythema, exudates, or enlargement, moist mucous membranes, good dentition, no lesions  NECK: Supple, No JVD, normal thyroid, carotids with normal upstrokes and no bruits  CHEST/LUNG: Clear to auscultation bilaterally, No rales, rhonchi, wheezing, or rubs  HEART: Regular rate and rhythm, No murmurs, rubs, or gallops  ABDOMEN: Soft, nondistended, no masses, guarding, tenderness or rebound, bowel sounds present  EXTREMITIES:  2+ Peripheral Pulses, No clubbing, cyanosis, or edema. (+) Fracture right clavicle  (+) right femoral neck fracture Percutaneous pinning of right hip  SKIN: No rashes or lesions  NERVOUS SYSTEM:  Alert & Oriented X3, normal cognitive function. Motor Strength 5/5 right upper and right lower.  5/5 left upper and left lower extremities, DTRs 2+ intact and symmetric    LABS:        CBC Full  -  ( 22 Sep 2019 09:38 )  WBC Count : 6.81 K/uL  RBC Count : 3.57 M/uL  Hemoglobin : 10.5 g/dL  Hematocrit : 33.4 %  Platelet Count - Automated : 195 K/uL  Mean Cell Volume : 93.6 fl  Mean Cell Hemoglobin : 29.4 pg  Mean Cell Hemoglobin Concentration : 31.4 gm/dL  Auto Neutrophil # : x  Auto Lymphocyte # : x  Auto Monocyte # : x  Auto Eosinophil # : x  Auto Basophil # : x  Auto Neutrophil % : x  Auto Lymphocyte % : x  Auto Monocyte % : x  Auto Eosinophil % : x  Auto Basophil % : x                       11.2   5.3   )-----------( 190      ( 21 Sep 2019 06:39 )             32.7    09-21    136  |  103  |  16  ----------------------------<  121<H>  3.9   |  23  |  0.90    Ca    8.3<L>      21 Sep 2019 06:39    TPro  6.1  /  Alb  3.7  /  TBili  0.6  /  DBili  x   /  AST  20  /  ALT  21  /  AlkPhos  98  09-20    PT/INR - ( 21 Sep 2019 06:39 )   PT: 12.4 sec;   INR: 1.08 ratio         PTT - ( 21 Sep 2019 06:39 )  PTT:27.8 sec

## 2019-09-22 NOTE — PHYSICAL THERAPY INITIAL EVALUATION ADULT - ADDITIONAL COMMENTS
as per pt, resides in a PH alone, +1 step to enter, 8 stairs indoor, PTA, pt amb (I), (I) with ADls.

## 2019-09-22 NOTE — PROGRESS NOTE ADULT - ASSESSMENT
A/P: 73y Female s/p R Hip CRPP.  VSS. NAD.    PT/OT- WBAT RLE, NWB RUE in sling  DVT PPx with Lovenox  IS encouraged  Followup AM labs  Pain Control  Continue Current Tx    Aleena Cummings PA-C  Team Pager: #[phone]

## 2019-09-23 LAB
ANION GAP SERPL CALC-SCNC: 9 MMOL/L — SIGNIFICANT CHANGE UP (ref 5–17)
BUN SERPL-MCNC: 12 MG/DL — SIGNIFICANT CHANGE UP (ref 7–23)
CALCIUM SERPL-MCNC: 8.9 MG/DL — SIGNIFICANT CHANGE UP (ref 8.4–10.5)
CHLORIDE SERPL-SCNC: 103 MMOL/L — SIGNIFICANT CHANGE UP (ref 96–108)
CO2 SERPL-SCNC: 23 MMOL/L — SIGNIFICANT CHANGE UP (ref 22–31)
CREAT SERPL-MCNC: 0.87 MG/DL — SIGNIFICANT CHANGE UP (ref 0.5–1.3)
GLUCOSE SERPL-MCNC: 115 MG/DL — HIGH (ref 70–99)
HCT VFR BLD CALC: 34.1 % — LOW (ref 34.5–45)
HGB BLD-MCNC: 10.6 G/DL — LOW (ref 11.5–15.5)
MCHC RBC-ENTMCNC: 29.4 PG — SIGNIFICANT CHANGE UP (ref 27–34)
MCHC RBC-ENTMCNC: 31.1 GM/DL — LOW (ref 32–36)
MCV RBC AUTO: 94.7 FL — SIGNIFICANT CHANGE UP (ref 80–100)
PLATELET # BLD AUTO: 197 K/UL — SIGNIFICANT CHANGE UP (ref 150–400)
POTASSIUM SERPL-MCNC: 4.4 MMOL/L — SIGNIFICANT CHANGE UP (ref 3.5–5.3)
POTASSIUM SERPL-SCNC: 4.4 MMOL/L — SIGNIFICANT CHANGE UP (ref 3.5–5.3)
RBC # BLD: 3.6 M/UL — LOW (ref 3.8–5.2)
RBC # FLD: 12.6 % — SIGNIFICANT CHANGE UP (ref 10.3–14.5)
SODIUM SERPL-SCNC: 135 MMOL/L — SIGNIFICANT CHANGE UP (ref 135–145)
WBC # BLD: 6.09 K/UL — SIGNIFICANT CHANGE UP (ref 3.8–10.5)
WBC # FLD AUTO: 6.09 K/UL — SIGNIFICANT CHANGE UP (ref 3.8–10.5)

## 2019-09-23 RX ORDER — DOCUSATE SODIUM 100 MG
1 CAPSULE ORAL
Qty: 0 | Refills: 0 | DISCHARGE
Start: 2019-09-23

## 2019-09-23 RX ORDER — OXYCODONE HYDROCHLORIDE 5 MG/1
1 TABLET ORAL
Qty: 0 | Refills: 0 | DISCHARGE
Start: 2019-09-23

## 2019-09-23 RX ORDER — ASPIRIN/CALCIUM CARB/MAGNESIUM 324 MG
1 TABLET ORAL
Qty: 80 | Refills: 0
Start: 2019-09-23 | End: 2019-11-01

## 2019-09-23 RX ADMIN — Medication 1 TABLET(S): at 12:19

## 2019-09-23 RX ADMIN — Medication 100 MILLIGRAM(S): at 05:20

## 2019-09-23 RX ADMIN — ENOXAPARIN SODIUM 40 MILLIGRAM(S): 100 INJECTION SUBCUTANEOUS at 12:19

## 2019-09-23 NOTE — DISCHARGE NOTE PROVIDER - NSDCACTIVITY_GEN_ALL_CORE
Walking - Outdoors allowed/No heavy lifting/straining/Do not make important decisions/Walking - Indoors allowed/Do not drive or operate machinery/Stairs allowed

## 2019-09-23 NOTE — DISCHARGE NOTE PROVIDER - HOSPITAL COURSE
Reason for Admission: right femoral neck fracture    History of Present Illness:     73[facility] RHD [facility] pain and R clavicle/shoulder pain and inability to ambulate after driving from [place] to help her friend during move.  Patient denies head hit or LOC. Localizes pain to R hip/femur. Patient denies radiation of pain. Patient denies numbness/tingling/burning in the RLE. Patient denies any other injuries. Patient is a community ambulator without assistive devices. Patient has no issues w/ ADLs/IADLs.  Patient is s/p nephrectomy for kidney donation.        Patient History:      Past Medical, Past Surgical History:    PAST MEDICAL HISTORY:    No pertinent past medical history.         PAST SURGICAL HISTORY:    H/O ventral hernia repair     S/P cholecystectomy     S/p nephrectomy.        HOSPITAL COURSE;    74 y/o FM  underwent closed reduction percutaneous pinning of right femoral neck hip fracture on 9/21/19 with Dr. Brown.  Patient tolerated procedure well. Patient also sustained right distal clavicle fracture which was treated non-operatively in sling.  Patient was evaluated postoperatively by physical and occupational therapists for WBAT right L/E, NWB right U/E in sling and advised that patient would benefit from admission to rehab facility.  Patient advised to keep surgical incision/dressing clean and dry, and have dressing  removed post op day #13 (10/4/19).  Patient further advised to follow up with Dr. Brown 3-4 weeks post op. Reason for Admission: right femoral neck fracture    History of Present Illness:     73[facility] RHD [facility] pain and R clavicle/shoulder pain and inability to ambulate after driving from [place] to help her friend during move.  Patient denies head hit or LOC. Localizes pain to R hip/femur. Patient denies radiation of pain. Patient denies numbness/tingling/burning in the RLE. Patient denies any other injuries. Patient is a community ambulator without assistive devices. Patient has no issues w/ ADLs/IADLs.  Patient is s/p nephrectomy for kidney donation.        Patient History:      Past Medical, Past Surgical History:    PAST MEDICAL HISTORY:    No pertinent past medical history.         PAST SURGICAL HISTORY:    H/O ventral hernia repair     S/P cholecystectomy     S/p nephrectomy.        HOSPITAL COURSE;    74 y/o FM  underwent closed reduction percutaneous pinning of right femoral neck hip fracture on 9/21/19 with Dr. Brown.  Patient tolerated procedure well. Patient also sustained right distal clavicle fracture which was treated non-operatively in sling.  Patient was evaluated postoperatively by physical and occupational therapists for WBAT right L/E, NWB right U/E in sling and cleared patient for discharge home.  Patient advised to keep surgical incision/dressing clean and dry, and follow up with Dr. Brown post op day #13 (10/4/19).

## 2019-09-23 NOTE — DISCHARGE NOTE PROVIDER - CARE PROVIDER_API CALL
Paulie Brown)  Orthopaedic Surgery  [address]  [place] [address]  Phone: [phone]  Fax: [phone]  Follow Up Time:

## 2019-09-23 NOTE — PROGRESS NOTE ADULT - ASSESSMENT
73y RHD Female s/p right hip hip Open reduction and internal fixation and a non op right clavicle fx

## 2019-09-23 NOTE — PROGRESS NOTE ADULT - ASSESSMENT
A/P: 73y Female s/p R Hip CRPP.  VSS. NAD.  PT/OT- WBAT RLE, NWB RUE in sling  DVT PPx with Lovenox  IS encouraged  Followup AM labs  Pain Control  Continue Current Tx

## 2019-09-23 NOTE — DISCHARGE NOTE PROVIDER - NSDCCPCAREPLAN_GEN_ALL_CORE_FT
PRINCIPAL DISCHARGE DIAGNOSIS  Diagnosis: Subcapital fracture of femur, right, closed, initial encounter  Assessment and Plan of Treatment:       SECONDARY DISCHARGE DIAGNOSES  Diagnosis: Right clavicle fracture  Assessment and Plan of Treatment: Right clavicle fracture

## 2019-09-23 NOTE — DISCHARGE NOTE PROVIDER - NSDCFUADDINST_GEN_ALL_CORE_FT
Continue weight bearing as tolerated ambulation on right lower extremity, maintain non-weight bearing on right upper extremity in sling. Remove surgical dressing post operative day #13(10/4/19). Follow up with Dr Brown 3-4 weeks post op Continue weight bearing as tolerated ambulation on right lower extremity, maintain non-weight bearing on right upper extremity in sling. Follow up with Dr. Brown post operative day #13(10/4/19) for wound check and dressing removal.

## 2019-09-23 NOTE — PROGRESS NOTE ADULT - SUBJECTIVE AND OBJECTIVE BOX
72 yo female with unremarkable pmhx presenting with shoulder and right hip pain s/p fall. Patient drove in from [place], walked up stairs and fell upstairs today, fell on her right shoulder and hip, and since then has had constant moderate pain since then. Has been unable to ambulate and move arm since then. Patient was seen in the ED at [facility] where she was foun to have a right hip fx and a right clavicle fracture. Patient seen now s/p Open reduction and internal fixation of a right hip fx . Patient states pain is well controlled.        MEDICATIONS  (STANDING):  docusate sodium 100 milliGRAM(s) Oral three times a day  enoxaparin Injectable 40 milliGRAM(s) SubCutaneous daily  influenza   Vaccine 0.5 milliLiter(s) IntraMuscular once  multivitamin 1 Tablet(s) Oral daily  sodium chloride 0.9%. 1000 milliLiter(s) (75 mL/Hr) IV Continuous <Continuous>    MEDICATIONS  (PRN):  bisacodyl Suppository 10 milliGRAM(s) Rectal daily PRN If no bowel movement by POD#2  diphenhydrAMINE 25 milliGRAM(s) Oral at bedtime PRN Insomnia  oxyCODONE    IR 10 milliGRAM(s) Oral every 4 hours PRN Severe Pain (7 - 10)  oxyCODONE    IR 5 milliGRAM(s) Oral every 4 hours PRN Moderate Pain (4 - 6)          VITALS:   T(C): 36.9 (09-24-19 @ 00:04), Max: 37.2 (09-23-19 @ 16:34)  HR: 77 (09-24-19 @ 00:04) (67 - 83)  BP: 135/82 (09-24-19 @ 00:04) (109/68 - 135/82)  RR: 18 (09-24-19 @ 00:04) (18 - 18)  SpO2: 99% (09-24-19 @ 00:04) (95% - 99%)  Wt(kg): --    PHYSICAL EXAM:  GENERAL: NAD, well nourished and conversant  HEAD:  Atraumatic  EYES: EOM, PERRLA, conjunctiva pink and sclera white  ENT: No tonsillar erythema, exudates, or enlargement, moist mucous membranes, good dentition, no lesions  NECK: Supple, No JVD, normal thyroid, carotids with normal upstrokes and no bruits  CHEST/LUNG: Clear to auscultation bilaterally, No rales, rhonchi, wheezing, or rubs  HEART: Regular rate and rhythm, No murmurs, rubs, or gallops  ABDOMEN: Soft, nondistended, no masses, guarding, tenderness or rebound, bowel sounds present  EXTREMITIES:  2+ Peripheral Pulses, No clubbing, cyanosis, or edema. (+) Fracture right clavicle  (+) right femoral neck fracture Percutaneous pinning of right hip  SKIN: No rashes or lesions  NERVOUS SYSTEM:  Alert & Oriented X3, normal cognitive function. Motor Strength 5/5 right upper and right lower.  5/5 left upper and left lower extremities, DTRs 2+ intact and symmetric    LABS:        CBC Full  -  ( 23 Sep 2019 09:49 )  WBC Count : 6.09 K/uL  RBC Count : 3.60 M/uL  Hemoglobin : 10.6 g/dL  Hematocrit : 34.1 %  Platelet Count - Automated : 197 K/uL  Mean Cell Volume : 94.7 fl  Mean Cell Hemoglobin : 29.4 pg  Mean Cell Hemoglobin Concentration : 31.1 gm/dL  Auto Neutrophil # : x  Auto Lymphocyte # : x  Auto Monocyte # : x  Auto Eosinophil # : x  Auto Basophil # : x  Auto Neutrophil % : x  Auto Lymphocyte % : x  Auto Monocyte % : x  Auto Eosinophil % : x  Auto Basophil % : x    09-23    135  |  103  |  12  ----------------------------<  115<H>  4.4   |  23  |  0.87    Ca    8.9      23 Sep 2019 07:10            CAPILLARY BLOOD GLUCOSE          RADIOLOGY & ADDITIONAL TESTS:

## 2019-09-23 NOTE — PROGRESS NOTE ADULT - SUBJECTIVE AND OBJECTIVE BOX
Subjective: Patient seen and examined at bedside. Reports no acute complaints at this time. Pain is well controlled.    Objective:    Vital Signs Last 24 Hrs  T(C): 37 (23 Sep 2019 05:25), Max: 37 (23 Sep 2019 05:25)  T(F): 98.6 (23 Sep 2019 05:25), Max: 98.6 (23 Sep 2019 05:25)  HR: 76 (23 Sep 2019 05:25) (76 - 88)  BP: 112/64 (23 Sep 2019 05:25) (106/64 - 120/66)  BP(mean): --  RR: 18 (23 Sep 2019 05:25) (18 - 18)  SpO2: 95% (23 Sep 2019 05:25) (95% - 99%)                          10.5   6.81  )-----------( 195      ( 22 Sep 2019 09:38 )             33.4     09-22    138  |  103  |  17  ----------------------------<  127<H>  4.2   |  23  |  0.95    Ca    8.3<L>      22 Sep 2019 07:19        PHYSICAL EXAM:    Gen: Alert and oriented, no acute distress  Lower Ext: RLE hip aquacel in place, incision site C/D/I  Upper Ext: in sling  Calves Soft, non-tender bilaterally  +PF/DF/EHL/FHL  +DP Pulse  SILT

## 2019-09-24 VITALS — HEART RATE: 66 BPM | SYSTOLIC BLOOD PRESSURE: 108 MMHG | DIASTOLIC BLOOD PRESSURE: 67 MMHG

## 2019-09-24 RX ORDER — OXYCODONE HYDROCHLORIDE 5 MG/1
1 TABLET ORAL
Qty: 42 | Refills: 0
Start: 2019-09-24 | End: 2019-09-30

## 2019-09-24 RX ORDER — ASPIRIN/CALCIUM CARB/MAGNESIUM 324 MG
1 TABLET ORAL
Qty: 80 | Refills: 0
Start: 2019-09-24 | End: 2019-11-02

## 2019-09-24 RX ADMIN — INFLUENZA VIRUS VACCINE 0.5 MILLILITER(S): 15; 15; 15; 15 SUSPENSION INTRAMUSCULAR at 12:17

## 2019-09-24 RX ADMIN — Medication 1 TABLET(S): at 11:41

## 2019-09-24 RX ADMIN — ENOXAPARIN SODIUM 40 MILLIGRAM(S): 100 INJECTION SUBCUTANEOUS at 11:42

## 2019-09-24 NOTE — DISCHARGE NOTE NURSING/CASE MANAGEMENT/SOCIAL WORK - PATIENT PORTAL LINK FT
You can access the FollowMyHealth Patient Portal offered by [facility] by registering at the following website: [email or web address]. By joining [employer]’s FollowMyHealth portal, you will also be able to view your health information using other applications (apps) compatible with our system.

## 2019-09-24 NOTE — PROGRESS NOTE ADULT - SUBJECTIVE AND OBJECTIVE BOX
Subjective: Patient seen and examined at bedside. Reports no acute complaints at this time. Pain is well controlled.    Objective:    Vital Signs Last 24 Hrs  T(C): 36.8 (24 Sep 2019 04:46), Max: 37.2 (23 Sep 2019 16:34)  T(F): 98.3 (24 Sep 2019 04:46), Max: 98.9 (23 Sep 2019 16:34)  HR: 72 (24 Sep 2019 04:46) (67 - 83)  BP: 105/69 (24 Sep 2019 04:46) (105/69 - 135/82)  BP(mean): --  RR: 18 (24 Sep 2019 04:46) (18 - 18)  SpO2: 96% (24 Sep 2019 04:46) (95% - 99%)                          10.6   6.09  )-----------( 197      ( 23 Sep 2019 09:49 )             34.1     09-23    135  |  103  |  12  ----------------------------<  115<H>  4.4   |  23  |  0.87    Ca    8.9      23 Sep 2019 07:10    PHYSICAL EXAM:    Gen: Alert and oriented, no acute distress  Lower Ext: RLE hip aquacel in place, incision site C/D/I  Upper Ext: in sling  Calves Soft, non-tender bilaterally  +PF/DF/EHL/FHL  +DP Pulse  SILT

## 2019-09-24 NOTE — PROGRESS NOTE ADULT - SUBJECTIVE AND OBJECTIVE BOX
74 yo female with unremarkable pmhx presenting with shoulder and right hip pain s/p fall. Patient drove in from [place], walked up stairs and fell upstairs today, fell on her right shoulder and hip, and since then has had constant moderate pain since then. Has been unable to ambulate and move arm since then. Patient was seen in the ED at [facility] where she was foun to have a right hip fx and a right clavicle fracture. Patient seen now s/p Open reduction and internal fixation of a right hip fx . Patient states pain is well controlled.        MEDICATIONS  (STANDING):  docusate sodium 100 milliGRAM(s) Oral three times a day  enoxaparin Injectable 40 milliGRAM(s) SubCutaneous daily  multivitamin 1 Tablet(s) Oral daily  sodium chloride 0.9%. 1000 milliLiter(s) (75 mL/Hr) IV Continuous <Continuous>    MEDICATIONS  (PRN):  bisacodyl Suppository 10 milliGRAM(s) Rectal daily PRN If no bowel movement by POD#2  diphenhydrAMINE 25 milliGRAM(s) Oral at bedtime PRN Insomnia  oxyCODONE    IR 10 milliGRAM(s) Oral every 4 hours PRN Severe Pain (7 - 10)  oxyCODONE    IR 5 milliGRAM(s) Oral every 4 hours PRN Moderate Pain (4 - 6)        Vital Signs Last 24 Hrs  T(C): 36.8 (24 Sep 2019 08:00), Max: 37.2 (23 Sep 2019 16:34)  T(F): 98.2 (24 Sep 2019 08:00), Max: 98.9 (23 Sep 2019 16:34)  HR: 68 (24 Sep 2019 08:00) (67 - 83)  BP: 110/69 (24 Sep 2019 08:00) (105/69 - 135/82)  BP(mean): --  RR: 18 (24 Sep 2019 08:00) (18 - 18)  SpO2: 95% (24 Sep 2019 08:00) (95% - 99%)    PHYSICAL EXAM:  GENERAL: NAD, well nourished and conversant  HEAD:  Atraumatic  EYES: EOM, PERRLA, conjunctiva pink and sclera white  ENT: No tonsillar erythema, exudates, or enlargement, moist mucous membranes, good dentition, no lesions  NECK: Supple, No JVD, normal thyroid, carotids with normal upstrokes and no bruits  CHEST/LUNG: Clear to auscultation bilaterally, No rales, rhonchi, wheezing, or rubs  HEART: Regular rate and rhythm, No murmurs, rubs, or gallops  ABDOMEN: Soft, nondistended, no masses, guarding, tenderness or rebound, bowel sounds present  EXTREMITIES:  2+ Peripheral Pulses, No clubbing, cyanosis, or edema. (+) Fracture right clavicle  (+) right femoral neck fracture Percutaneous pinning of right hip  SKIN: No rashes or lesions  NERVOUS SYSTEM:  Alert & Oriented X3, normal cognitive function. Motor Strength 5/5 right upper and right lower.  5/5 left upper and left lower extremities, DTRs 2+ intact and symmetric    LABS: No labs obtained today                CAPILLARY BLOOD GLUCOSE          RADIOLOGY & ADDITIONAL TESTS: 72 yo female with unremarkable pmhx presenting with shoulder and right hip pain s/p fall. Patient drove in from [place], walked up stairs and fell upstairs today, fell on her right shoulder and hip, and since then has had constant moderate pain since then. Has been unable to ambulate and move arm since then. Patient was seen in the ED at [facility] where she was foun to have a right hip fx and a right clavicle fracture.The patient underwent a right CRPP of her right femoral neck fracture on 09/21/19. Patient seen now s/p Open reduction and internal fixation of a right hip fx . Patient states pain is well controlled.She is walking with a walker and one person assist and cleared by orthopedics  for discharge to home.        MEDICATIONS  (STANDING):  docusate sodium 100 milliGRAM(s) Oral three times a day  enoxaparin Injectable 40 milliGRAM(s) SubCutaneous daily  multivitamin 1 Tablet(s) Oral daily  sodium chloride 0.9%. 1000 milliLiter(s) (75 mL/Hr) IV Continuous <Continuous>    MEDICATIONS  (PRN):  bisacodyl Suppository 10 milliGRAM(s) Rectal daily PRN If no bowel movement by POD#2  diphenhydrAMINE 25 milliGRAM(s) Oral at bedtime PRN Insomnia  oxyCODONE    IR 10 milliGRAM(s) Oral every 4 hours PRN Severe Pain (7 - 10)  oxyCODONE    IR 5 milliGRAM(s) Oral every 4 hours PRN Moderate Pain (4 - 6)        Vital Signs Last 24 Hrs  T(C): 36.8 (24 Sep 2019 08:00), Max: 37.2 (23 Sep 2019 16:34)  T(F): 98.2 (24 Sep 2019 08:00), Max: 98.9 (23 Sep 2019 16:34)  HR: 68 (24 Sep 2019 08:00) (67 - 83)  BP: 110/69 (24 Sep 2019 08:00) (105/69 - 135/82)  BP(mean): --  RR: 18 (24 Sep 2019 08:00) (18 - 18)  SpO2: 95% (24 Sep 2019 08:00) (95% - 99%)    PHYSICAL EXAM:  GENERAL: NAD, well nourished and conversant  HEAD:  Atraumatic  EYES: EOM, PERRLA, conjunctiva pink and sclera white  ENT: No tonsillar erythema, exudates, or enlargement, moist mucous membranes, good dentition, no lesions  NECK: Supple, No JVD, normal thyroid, carotids with normal upstrokes and no bruits  CHEST/LUNG: Clear to auscultation bilaterally, No rales, rhonchi, wheezing, or rubs  HEART: Regular rate and rhythm, No murmurs, rubs, or gallops  ABDOMEN: Soft, nondistended, no masses, guarding, tenderness or rebound, bowel sounds present  EXTREMITIES:  2+ Peripheral Pulses, No clubbing, cyanosis, or edema. (+) Fracture right clavicle  (+) right femoral neck fracture Percutaneous pinning of right hip  SKIN: No rashes or lesions  NERVOUS SYSTEM:  Alert & Oriented X3, normal cognitive function. Motor Strength 5/5 right upper and right lower.  5/5 left upper and left lower extremities, DTRs 2+ intact and symmetric    LABS: No labs obtained today                CAPILLARY BLOOD GLUCOSE          RADIOLOGY & ADDITIONAL TESTS:

## 2019-09-24 NOTE — PROGRESS NOTE ADULT - REASON FOR ADMISSION
right femoral neck fracture
right femoral neck fracture, R clavicle fracture
right femoral neck fracture

## 2019-10-13 PROCEDURE — 99285 EMERGENCY DEPT VISIT HI MDM: CPT

## 2019-10-13 PROCEDURE — 80053 COMPREHEN METABOLIC PANEL: CPT

## 2019-10-13 PROCEDURE — 76000 FLUOROSCOPY <1 HR PHYS/QHP: CPT

## 2019-10-13 PROCEDURE — 97530 THERAPEUTIC ACTIVITIES: CPT

## 2019-10-13 PROCEDURE — 93005 ELECTROCARDIOGRAM TRACING: CPT

## 2019-10-13 PROCEDURE — 73060 X-RAY EXAM OF HUMERUS: CPT

## 2019-10-13 PROCEDURE — 70450 CT HEAD/BRAIN W/O DYE: CPT

## 2019-10-13 PROCEDURE — 97162 PT EVAL MOD COMPLEX 30 MIN: CPT

## 2019-10-13 PROCEDURE — 81001 URINALYSIS AUTO W/SCOPE: CPT

## 2019-10-13 PROCEDURE — 80048 BASIC METABOLIC PNL TOTAL CA: CPT

## 2019-10-13 PROCEDURE — 85610 PROTHROMBIN TIME: CPT

## 2019-10-13 PROCEDURE — 72192 CT PELVIS W/O DYE: CPT

## 2019-10-13 PROCEDURE — 86901 BLOOD TYPING SEROLOGIC RH(D): CPT

## 2019-10-13 PROCEDURE — 71045 X-RAY EXAM CHEST 1 VIEW: CPT

## 2019-10-13 PROCEDURE — 73552 X-RAY EXAM OF FEMUR 2/>: CPT

## 2019-10-13 PROCEDURE — C1713: CPT

## 2019-10-13 PROCEDURE — 73502 X-RAY EXAM HIP UNI 2-3 VIEWS: CPT

## 2019-10-13 PROCEDURE — 86850 RBC ANTIBODY SCREEN: CPT

## 2019-10-13 PROCEDURE — 72125 CT NECK SPINE W/O DYE: CPT

## 2019-10-13 PROCEDURE — 76377 3D RENDER W/INTRP POSTPROCES: CPT

## 2019-10-13 PROCEDURE — 73030 X-RAY EXAM OF SHOULDER: CPT

## 2019-10-13 PROCEDURE — 97116 GAIT TRAINING THERAPY: CPT

## 2019-10-13 PROCEDURE — 73000 X-RAY EXAM OF COLLAR BONE: CPT

## 2019-10-13 PROCEDURE — 85027 COMPLETE CBC AUTOMATED: CPT

## 2019-10-13 PROCEDURE — 86900 BLOOD TYPING SEROLOGIC ABO: CPT

## 2019-10-13 PROCEDURE — 85730 THROMBOPLASTIN TIME PARTIAL: CPT

## 2019-10-13 PROCEDURE — 90686 IIV4 VACC NO PRSV 0.5 ML IM: CPT

## 2019-12-05 NOTE — CONSULT NOTE ADULT - PROBLEM/RECOMMENDATION-1
DISPLAY PLAN FREE TEXT Myalgia Monitoring: I explained this is common when taking isotretinoin. If this worsens they will contact us.

## 2020-08-05 NOTE — CONSULT NOTE ADULT - CONSULT REQUESTED BY NAME
Detail Level: Zone
IVAN Brown
Modify Regimen: Ketoconazole shampoo only twice weekly. Clobetasol sol 3 times weekly.

## 2023-01-23 PROBLEM — Z00.00 ENCOUNTER FOR PREVENTIVE HEALTH EXAMINATION: Status: ACTIVE | Noted: 2023-01-23
